# Patient Record
Sex: MALE | Race: BLACK OR AFRICAN AMERICAN | NOT HISPANIC OR LATINO | URBAN - METROPOLITAN AREA
[De-identification: names, ages, dates, MRNs, and addresses within clinical notes are randomized per-mention and may not be internally consistent; named-entity substitution may affect disease eponyms.]

---

## 2019-04-02 ENCOUNTER — EMERGENCY (EMERGENCY)
Facility: HOSPITAL | Age: 39
LOS: 0 days | Discharge: HOME | End: 2019-04-02
Admitting: PHYSICIAN ASSISTANT
Payer: SUBSIDIZED

## 2019-04-02 VITALS
SYSTOLIC BLOOD PRESSURE: 126 MMHG | HEART RATE: 88 BPM | DIASTOLIC BLOOD PRESSURE: 68 MMHG | TEMPERATURE: 99 F | OXYGEN SATURATION: 98 % | RESPIRATION RATE: 18 BRPM

## 2019-04-02 VITALS — TEMPERATURE: 99 F

## 2019-04-02 DIAGNOSIS — L03.115 CELLULITIS OF RIGHT LOWER LIMB: ICD-10-CM

## 2019-04-02 DIAGNOSIS — M79.89 OTHER SPECIFIED SOFT TISSUE DISORDERS: ICD-10-CM

## 2019-04-02 LAB
ALBUMIN SERPL ELPH-MCNC: 4.4 G/DL — SIGNIFICANT CHANGE UP (ref 3.5–5.2)
ALP SERPL-CCNC: 66 U/L — SIGNIFICANT CHANGE UP (ref 30–115)
ALT FLD-CCNC: 26 U/L — SIGNIFICANT CHANGE UP (ref 0–41)
ANION GAP SERPL CALC-SCNC: 15 MMOL/L — HIGH (ref 7–14)
APTT BLD: SIGNIFICANT CHANGE UP SEC (ref 27–39.2)
AST SERPL-CCNC: 24 U/L — SIGNIFICANT CHANGE UP (ref 0–41)
BILIRUB SERPL-MCNC: 0.6 MG/DL — SIGNIFICANT CHANGE UP (ref 0.2–1.2)
BUN SERPL-MCNC: 15 MG/DL — SIGNIFICANT CHANGE UP (ref 10–20)
CALCIUM SERPL-MCNC: 9.2 MG/DL — SIGNIFICANT CHANGE UP (ref 8.5–10.1)
CHLORIDE SERPL-SCNC: 98 MMOL/L — SIGNIFICANT CHANGE UP (ref 98–110)
CO2 SERPL-SCNC: 24 MMOL/L — SIGNIFICANT CHANGE UP (ref 17–32)
CREAT SERPL-MCNC: 0.9 MG/DL — SIGNIFICANT CHANGE UP (ref 0.7–1.5)
GLUCOSE SERPL-MCNC: 100 MG/DL — HIGH (ref 70–99)
HCT VFR BLD CALC: 42.1 % — SIGNIFICANT CHANGE UP (ref 42–52)
HGB BLD-MCNC: 13.7 G/DL — LOW (ref 14–18)
INR BLD: 1.16 RATIO — SIGNIFICANT CHANGE UP (ref 0.65–1.3)
LACTATE SERPL-SCNC: 0.9 MMOL/L — SIGNIFICANT CHANGE UP (ref 0.5–2.2)
MCHC RBC-ENTMCNC: 29.3 PG — SIGNIFICANT CHANGE UP (ref 27–31)
MCHC RBC-ENTMCNC: 32.5 G/DL — SIGNIFICANT CHANGE UP (ref 32–37)
MCV RBC AUTO: 90.1 FL — SIGNIFICANT CHANGE UP (ref 80–94)
NRBC # BLD: 0 /100 WBCS — SIGNIFICANT CHANGE UP (ref 0–0)
PLATELET # BLD AUTO: 203 K/UL — SIGNIFICANT CHANGE UP (ref 130–400)
POTASSIUM SERPL-MCNC: 4.6 MMOL/L — SIGNIFICANT CHANGE UP (ref 3.5–5)
POTASSIUM SERPL-SCNC: 4.6 MMOL/L — SIGNIFICANT CHANGE UP (ref 3.5–5)
PROT SERPL-MCNC: 8.1 G/DL — HIGH (ref 6–8)
PROTHROM AB SERPL-ACNC: 13.3 SEC — HIGH (ref 9.95–12.87)
RBC # BLD: 4.67 M/UL — LOW (ref 4.7–6.1)
RBC # FLD: 11.9 % — SIGNIFICANT CHANGE UP (ref 11.5–14.5)
SODIUM SERPL-SCNC: 137 MMOL/L — SIGNIFICANT CHANGE UP (ref 135–146)
WBC # BLD: 7.65 K/UL — SIGNIFICANT CHANGE UP (ref 4.8–10.8)
WBC # FLD AUTO: 7.65 K/UL — SIGNIFICANT CHANGE UP (ref 4.8–10.8)

## 2019-04-02 PROCEDURE — 99284 EMERGENCY DEPT VISIT MOD MDM: CPT

## 2019-04-02 PROCEDURE — 93970 EXTREMITY STUDY: CPT | Mod: 26

## 2019-04-02 RX ADMIN — Medication 110 MILLIGRAM(S): at 17:12

## 2019-04-02 NOTE — ED PROVIDER NOTE - OBJECTIVE STATEMENT
38 year old male with no pmhx presents with redness and swelling to right calf x few days. Pt admits noticed it on left calf which has not resolved. Pt denies trauma, injury, numbness or weakness to extremity, CP, SOB, or fever. no recent travel or yard work.

## 2019-04-02 NOTE — ED PROVIDER NOTE - PHYSICAL EXAMINATION
CONST: Well appearing in NAD  EYES: PERRL, EOMI, Sclera and conjunctiva clear.   ENT: No nasal discharge. TM's clear B/L without drainage. Oropharynx normal appearing, no erythema or exudates. No abscess or swelling. Uvula midline.  NECK: Non-tender, no meningeal signs. normal ROM. supple   CARD: Normal S1 S2; Normal rate and rhythm  RESP: Equal BS B/L, No wheezes, rhonchi or rales. No distress  GI: Soft, non-tender, non-distended. no cva tenderness. normal BS  MS: Normal ROM in all extremities. No midline spinal tenderness. pulses 2 +.   SKIN:  + erythema noted to right anterior shin, likely cellulitis, no abscess or fluculance, mild erythema to left distal anterior shin  NEURO: A&Ox3, No focal deficits. Strength 5/5 with no sensory deficits. Steady gait.

## 2019-04-02 NOTE — ED PROVIDER NOTE - CONDITION AT DISCHARGE:
ESCITALOPRAM 20MG TABLETS       Last Written Prescription Date: 4/26/17  Last Fill Quantity: 30, # refills: 0  [place] [facility]:  8/23/16        Last PHQ-9 score on record=   PHQ-9 SCORE 4/28/2014   Total Score 3                  Improved

## 2019-04-02 NOTE — ED PROVIDER NOTE - NS ED ROS FT
Review of Systems:  	•	CONSTITUTIONAL - no fever, no diaphoresis, no chills  	•	SKIN - no rash  	•	EYES - no eye pain, no blurry vision  	•	ENT - no change in hearing, no sore throat, no ear pain or tinnitus  	•	RESPIRATORY - no shortness of breath, no cough  	•	CARDIAC - no chest pain, no palpitations  	•	GI - no abd pain, no nausea, no vomiting, no diarrhea, no constipation  	•	GENITO-URINARY - no discharge, no dysuria; no hematuria, no increased urinary frequency  	•	MUSCULOSKELETAL - + calf redness and swelling   	•	NEUROLOGIC - no weakness, no headache, no paresthesias, no LOC

## 2019-04-02 NOTE — ED PROVIDER NOTE - CLINICAL SUMMARY MEDICAL DECISION MAKING FREE TEXT BOX
Pt presenting with cellulitis to right calf, wbc normal, no fever, no medical conditions.. will give PO axbx, and wound follow up.. return precautions given to pt

## 2019-04-02 NOTE — ED ADULT NURSE NOTE - OBJECTIVE STATEMENT
pt comes in with complaints of right leg pain, swelling and redness. left leg is also swollen and red. not as bad as right.

## 2020-07-06 ENCOUNTER — EMERGENCY (EMERGENCY)
Facility: HOSPITAL | Age: 40
LOS: 0 days | Discharge: HOME | End: 2020-07-06
Attending: EMERGENCY MEDICINE | Admitting: EMERGENCY MEDICINE
Payer: MEDICAID

## 2020-07-06 VITALS
RESPIRATION RATE: 20 BRPM | OXYGEN SATURATION: 94 % | DIASTOLIC BLOOD PRESSURE: 80 MMHG | WEIGHT: 274.92 LBS | SYSTOLIC BLOOD PRESSURE: 143 MMHG | TEMPERATURE: 98 F | HEART RATE: 91 BPM

## 2020-07-06 DIAGNOSIS — M54.9 DORSALGIA, UNSPECIFIED: ICD-10-CM

## 2020-07-06 DIAGNOSIS — M25.569 PAIN IN UNSPECIFIED KNEE: ICD-10-CM
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
## 2020-07-06 DIAGNOSIS — S89.92XA UNSPECIFIED INJURY OF LEFT LOWER LEG, INITIAL ENCOUNTER: ICD-10-CM

## 2020-07-06 PROCEDURE — 99284 EMERGENCY DEPT VISIT MOD MDM: CPT

## 2020-07-06 PROCEDURE — 72100 X-RAY EXAM L-S SPINE 2/3 VWS: CPT | Mod: 26

## 2020-07-06 PROCEDURE — 73562 X-RAY EXAM OF KNEE 3: CPT | Mod: 26,LT

## 2020-07-06 RX ORDER — METHOCARBAMOL 500 MG/1
1000 TABLET, FILM COATED ORAL ONCE
Refills: 0 | Status: COMPLETED | OUTPATIENT
Start: 2020-07-06 | End: 2020-07-06

## 2020-07-06 RX ORDER — IBUPROFEN 200 MG
800 TABLET ORAL ONCE
Refills: 0 | Status: COMPLETED | OUTPATIENT
Start: 2020-07-06 | End: 2020-07-06

## 2020-07-06 RX ORDER — IBUPROFEN 200 MG
1 TABLET ORAL
Qty: 21 | Refills: 0
Start: 2020-07-06 | End: 2020-07-12

## 2020-07-06 RX ORDER — METHOCARBAMOL 500 MG/1
1 TABLET, FILM COATED ORAL
Qty: 21 | Refills: 0
Start: 2020-07-06 | End: 2020-07-12

## 2020-07-06 RX ADMIN — METHOCARBAMOL 1000 MILLIGRAM(S): 500 TABLET, FILM COATED ORAL at 14:49

## 2020-07-06 RX ADMIN — Medication 800 MILLIGRAM(S): at 14:48

## 2020-07-06 NOTE — ED PROVIDER NOTE - ATTENDING CONTRIBUTION TO CARE
41 y/o male denies sig PMH c/o left knee and back pain s/p slip and fall on wet surface last night, pain is constant, worse with certain movements and position, better with rest, denies head trauma, LOC, paresthesias or other complaints at present.     No old chart available for review.  I have reviewed and agree with the nursing note, except as documented in my note.    VSS, awake, alert, non-toxic appearing, Head NC / NT, PERRL / EOMI, no hemotympanum, +S1/S2, RRR, no m/r/g, abdomen soft, NT, ND, +BS, no midline spinal tenderness, step-offs or deformities, able to dorsiflex b/l great toe, motor and sensation intact b/l LE, NV intact, LLE; no hip, ankle or foot tenderness, lft knee; mild ttp anteriorly, otherwise appears symmetrical, no gross deformity, swelling, crepitus, joint line tenderness, able to fully flex and extend knee without encouragement, no joint laxity noted on varus or valgus stress at 0 and 30 degrees, normal Lachman and posterior drawer, motor and sensation intact distally, NV intact with 2+ DP pulses.

## 2020-07-06 NOTE — ED PROVIDER NOTE - OBJECTIVE STATEMENT
41 y/o male presents to the ED c/o "I slipped and fell on wet floor and I twisted my left knee and I hurt my back." no head trauma/ LOC/ dizziness/ nausea/ vomit

## 2020-07-06 NOTE — ED PROVIDER NOTE - PROGRESS NOTE DETAILS
BH: prior to d/c xrays reviewed, pain improved while in ED with analgesics, RICE instructions given. The patient was given detailed return precautions and advised to return to the emergency department if any new symptoms developed, symptoms worsened or for any concerns. The patient was offered the opportunity to ask questions and verbalized that they understand the diagnosis and discharge instructions.

## 2020-07-06 NOTE — ED PROVIDER NOTE - CARE PROVIDER_API CALL
Juan Guzman  Orthopaedic Surgery  [address] [place] [address]  Phone: [phone]  Fax: [phone]  Follow Up Time:

## 2020-07-06 NOTE — ED PROVIDER NOTE - PATIENT PORTAL LINK FT
You can access the FollowMyHealth Patient Portal offered by [facility] by registering at the following website: [email or web address]. By joining [employer]’s FollowMyHealth portal, you will also be able to view your health information using other applications (apps) compatible with our system.

## 2020-07-06 NOTE — ED PROVIDER NOTE - CARE PLAN
Principal Discharge DX:	Fall  Secondary Diagnosis:	Back pain  Secondary Diagnosis:	Knee injury, left, initial encounter

## 2020-07-06 NOTE — ED PROVIDER NOTE - NSFOLLOWUPINSTRUCTIONS_ED_ALL_ED_FT
Knee Pain    WHAT YOU NEED TO KNOW:    What do I need to know about knee pain? Knee pain may start suddenly, or it may be a long-term problem. You may have pain on the side, front, or back of your knee. You may have knee stiffness and swelling. You may hear popping sounds or feel like your knee is giving way or locking up as you walk. You may feel pain when you sit, stand, walk, or climb up and down stairs.    What increases my risk for knee pain?     Obesity      A strain or tear in ligaments or tendons      A leg fracture or knee dislocation      Overuse of your knee      Osteoarthritis, rheumatoid arthritis, or gout      An infection, tumor, or cyst in your knee      Shoes that are not supportive, or training on a hard surface      Sports that involve jumping or pivoting on your knee    How is the cause of knee pain diagnosed? Your healthcare provider will examine your knee and ask about your symptoms. Tell your provider when the pain started and what you were doing at the time. Describe the pain, such as sharp, throbbing, or achy. Tell your provider about any knee injury or surgery you had. You may need any of the following:     MRI, CT, or ultrasound pictures may show an injury, fracture, or tumor.       Blood tests may be used to check the level of inflammation in your blood. The tests may also show signs of infection.      Arthroscopy is a procedure to look inside your knee joint with an arthroscope. An arthroscope is a flexible tube with a light and camera on the end. A knee arthroscopy is usually done to check for disease or damage inside your knee. These problems may be fixed during the procedure.    How is knee pain treated? Treatment will depend on the cause of your pain. You may need any of the following:     NSAIDs help decrease swelling and pain or fever. This medicine is available with or without a doctor's order. NSAIDs can cause stomach bleeding or kidney problems in certain people. If you take blood thinner medicine, always ask your healthcare provider if NSAIDs are safe for you. Always read the medicine label and follow directions.      Acetaminophen decreases pain and fever. It is available without a doctor's order. Ask how much to take and how often to take it. Follow directions. Read the labels of all other medicines you are using to see if they also contain acetaminophen, or ask your doctor or pharmacist. Acetaminophen can cause liver damage if not taken correctly. Do not use more than 4 grams (4,000 milligrams) total of acetaminophen in one day.       Prescription pain medicine may be given. Ask your healthcare provider how to take this medicine safely. Some prescription pain medicines contain acetaminophen. Do not take other medicines that contain acetaminophen without talking to your healthcare provider. Too much acetaminophen may cause liver damage. Prescription pain medicine may cause constipation. Ask your healthcare provider how to prevent or treat constipation.       Steroid injections may be given into your knee. Steroids reduce inflammation and pain.      Surgery may be used for some injuries, such as to repair a torn ACL.    What can I do to manage my symptoms?     Rest your knee so it can heal. Limit activities that increase your pain. Do low-impact exercises, such as walking or swimming.       Apply ice to help reduce swelling and pain. Use an ice pack, or put crushed ice in a plastic bag. Cover it with a towel before you apply it to your knee. Apply ice for 15 to 20 minutes every hour, or as directed.      Apply compression to help reduce swelling. Use a brace or bandage only as directed.      Elevate your knee to help decrease pain and swelling. Elevate your knee while you are sitting or lying down. Prop your leg on pillows to keep your knee above the level of your heart.      Prevent your knee from moving as directed. Your healthcare provider may put on a cast or splint. You may need to wear a leg brace to stabilize your knee. A leg brace can be adjusted to increase your range of motion as your knee heals.Hinged Knee Braces          What can I do to prevent knee pain?     Maintain a healthy weight. Extra weight increases your risk for knee pain. Ask your healthcare provider how much you should weigh. He or she can help you create a safe weight loss plan if you need to lose weight.      Exercise or train properly. Use the correct equipment for sports. Wear shoes that provide good support. Check your posture often as you exercise, play sports, or train for an event. This can help prevent stress and strain on your knees. Rest between sessions so you do not overwork your knees.    When should I seek immediate care?     Your pain is worse, even after treatment.       You cannot bend or straighten your leg completely.       The swelling around your knee does not go down even with treatment.      Your knee is painful and hot to the touch.     When should I contact my healthcare provider?     You have questions or concerns about your condition or care.         CARE AGREEMENT:    You have the right to help plan your care. Learn about your health condition and how it may be treated. Discuss treatment options with your healthcare providers to decide what care you want to receive. You always have the right to refuse treatment.     Back Pain    Back pain is very common in adults. The cause of back pain is rarely dangerous and the pain often gets better over time. The cause of your back pain may not be known and may include strain of muscles or ligaments, degeneration of the spinal disks, or arthritis. Occasionally the pain may radiate down your leg(s). Over-the-counter medicines to reduce pain and inflammation are often the most helpful. Stretching and remaining active frequently helps the healing process.     SEEK IMMEDIATE MEDICAL CARE IF YOU HAVE THE FOLLOWING SYMPTOMS: bowel or bladder control problems, unusual weakness or numbness in your arms or legs, nausea or vomiting, abdominal pain, fever, dizziness/lightheadedness.

## 2022-12-03 NOTE — ED ADULT NURSE NOTE - NS ED NURSE RECORD ANOTHER HT AND WT
No You can access the FollowMyHealth Patient Portal offered by [facility] by registering at the following website: [email or web address]. By joining [facility]’s FollowMyHealth portal, you will also be able to view your health information using other applications (apps) compatible with our system.

## 2023-10-02 NOTE — ED PROVIDER NOTE - VASCULAR COMPROMISE
For the arthritis: Start 1 capsule of duloxetine daily.  Continue with tylenol
no vascular compromise

## 2024-06-04 ENCOUNTER — EMERGENCY (EMERGENCY)
Facility: HOSPITAL | Age: 44
LOS: 0 days | Discharge: HOME | End: 2024-06-04
Attending: EMERGENCY MEDICINE
Payer: MEDICAID

## 2024-06-04 VITALS
OXYGEN SATURATION: 99 % | WEIGHT: 315 LBS | RESPIRATION RATE: 16 BRPM | HEART RATE: 84 BPM | SYSTOLIC BLOOD PRESSURE: 150 MMHG | TEMPERATURE: 98 F | DIASTOLIC BLOOD PRESSURE: 100 MMHG

## 2024-06-04 VITALS — DIASTOLIC BLOOD PRESSURE: 87 MMHG | SYSTOLIC BLOOD PRESSURE: 133 MMHG | HEART RATE: 105 BPM

## 2024-06-04 DIAGNOSIS — R29.6 REPEATED FALLS: ICD-10-CM

## 2024-06-04 DIAGNOSIS — R73.9 HYPERGLYCEMIA, UNSPECIFIED: ICD-10-CM

## 2024-06-04 DIAGNOSIS — R35.0 FREQUENCY OF MICTURITION: ICD-10-CM

## 2024-06-04 DIAGNOSIS — R30.0 DYSURIA: ICD-10-CM

## 2024-06-04 DIAGNOSIS — R63.1 POLYDIPSIA: ICD-10-CM

## 2024-06-04 PROBLEM — Z78.9 OTHER SPECIFIED HEALTH STATUS: Chronic | Status: ACTIVE | Noted: 2020-07-06

## 2024-06-04 LAB
ALBUMIN SERPL ELPH-MCNC: 4.3 G/DL — SIGNIFICANT CHANGE UP (ref 3.5–5.2)
ALBUMIN SERPL ELPH-MCNC: 4.4 G/DL — SIGNIFICANT CHANGE UP (ref 3.5–5.2)
ALP SERPL-CCNC: 107 U/L — SIGNIFICANT CHANGE UP (ref 30–115)
ALP SERPL-CCNC: 112 U/L — SIGNIFICANT CHANGE UP (ref 30–115)
ALT FLD-CCNC: 30 U/L — SIGNIFICANT CHANGE UP (ref 0–41)
ALT FLD-CCNC: 36 U/L — SIGNIFICANT CHANGE UP (ref 0–41)
ANION GAP SERPL CALC-SCNC: 10 MMOL/L — SIGNIFICANT CHANGE UP (ref 7–14)
ANION GAP SERPL CALC-SCNC: 13 MMOL/L — SIGNIFICANT CHANGE UP (ref 7–14)
APPEARANCE UR: CLEAR — SIGNIFICANT CHANGE UP
AST SERPL-CCNC: 21 U/L — SIGNIFICANT CHANGE UP (ref 0–41)
AST SERPL-CCNC: 71 U/L — HIGH (ref 0–41)
B-OH-BUTYR SERPL-SCNC: 0.4 MMOL/L — SIGNIFICANT CHANGE UP
BASOPHILS # BLD AUTO: 0.03 K/UL — SIGNIFICANT CHANGE UP (ref 0–0.2)
BASOPHILS NFR BLD AUTO: 0.5 % — SIGNIFICANT CHANGE UP (ref 0–1)
BILIRUB SERPL-MCNC: 0.4 MG/DL — SIGNIFICANT CHANGE UP (ref 0.2–1.2)
BILIRUB SERPL-MCNC: 0.5 MG/DL — SIGNIFICANT CHANGE UP (ref 0.2–1.2)
BILIRUB UR-MCNC: NEGATIVE — SIGNIFICANT CHANGE UP
BUN SERPL-MCNC: 11 MG/DL — SIGNIFICANT CHANGE UP (ref 10–20)
BUN SERPL-MCNC: 12 MG/DL — SIGNIFICANT CHANGE UP (ref 10–20)
CALCIUM SERPL-MCNC: 9.2 MG/DL — SIGNIFICANT CHANGE UP (ref 8.4–10.4)
CALCIUM SERPL-MCNC: 9.5 MG/DL — SIGNIFICANT CHANGE UP (ref 8.4–10.5)
CHLORIDE SERPL-SCNC: 100 MMOL/L — SIGNIFICANT CHANGE UP (ref 98–110)
CHLORIDE SERPL-SCNC: 96 MMOL/L — LOW (ref 98–110)
CO2 SERPL-SCNC: 21 MMOL/L — SIGNIFICANT CHANGE UP (ref 17–32)
CO2 SERPL-SCNC: 24 MMOL/L — SIGNIFICANT CHANGE UP (ref 17–32)
COLOR SPEC: YELLOW — SIGNIFICANT CHANGE UP
CREAT SERPL-MCNC: 0.9 MG/DL — SIGNIFICANT CHANGE UP (ref 0.7–1.5)
CREAT SERPL-MCNC: 1 MG/DL — SIGNIFICANT CHANGE UP (ref 0.7–1.5)
DIFF PNL FLD: ABNORMAL
EGFR: 107 ML/MIN/1.73M2 — SIGNIFICANT CHANGE UP
EGFR: 95 ML/MIN/1.73M2 — SIGNIFICANT CHANGE UP
EOSINOPHIL # BLD AUTO: 0.1 K/UL — SIGNIFICANT CHANGE UP (ref 0–0.7)
EOSINOPHIL NFR BLD AUTO: 1.8 % — SIGNIFICANT CHANGE UP (ref 0–8)
GAS PNL BLDV: SIGNIFICANT CHANGE UP
GLUCOSE SERPL-MCNC: 367 MG/DL — HIGH (ref 70–99)
GLUCOSE SERPL-MCNC: 424 MG/DL — HIGH (ref 70–99)
GLUCOSE UR QL: >=1000 MG/DL
HCT VFR BLD CALC: 46.1 % — SIGNIFICANT CHANGE UP (ref 42–52)
HCV AB S/CO SERPL IA: 0.06 COI — SIGNIFICANT CHANGE UP
HCV AB SERPL-IMP: SIGNIFICANT CHANGE UP
HGB BLD-MCNC: 15.7 G/DL — SIGNIFICANT CHANGE UP (ref 14–18)
HIV 1 & 2 AB SERPL IA.RAPID: SIGNIFICANT CHANGE UP
IMM GRANULOCYTES NFR BLD AUTO: 0.4 % — HIGH (ref 0.1–0.3)
KETONES UR-MCNC: NEGATIVE MG/DL — SIGNIFICANT CHANGE UP
LEUKOCYTE ESTERASE UR-ACNC: NEGATIVE — SIGNIFICANT CHANGE UP
LYMPHOCYTES # BLD AUTO: 2.37 K/UL — SIGNIFICANT CHANGE UP (ref 1.2–3.4)
LYMPHOCYTES # BLD AUTO: 43.2 % — SIGNIFICANT CHANGE UP (ref 20.5–51.1)
MAGNESIUM SERPL-MCNC: 2.1 MG/DL — SIGNIFICANT CHANGE UP (ref 1.8–2.4)
MCHC RBC-ENTMCNC: 29.7 PG — SIGNIFICANT CHANGE UP (ref 27–31)
MCHC RBC-ENTMCNC: 34.1 G/DL — SIGNIFICANT CHANGE UP (ref 32–37)
MCV RBC AUTO: 87.3 FL — SIGNIFICANT CHANGE UP (ref 80–94)
MONOCYTES # BLD AUTO: 0.54 K/UL — SIGNIFICANT CHANGE UP (ref 0.1–0.6)
MONOCYTES NFR BLD AUTO: 9.9 % — HIGH (ref 1.7–9.3)
NEUTROPHILS # BLD AUTO: 2.42 K/UL — SIGNIFICANT CHANGE UP (ref 1.4–6.5)
NEUTROPHILS NFR BLD AUTO: 44.2 % — SIGNIFICANT CHANGE UP (ref 42.2–75.2)
NITRITE UR-MCNC: NEGATIVE — SIGNIFICANT CHANGE UP
NRBC # BLD: 0 /100 WBCS — SIGNIFICANT CHANGE UP (ref 0–0)
PH UR: 6 — SIGNIFICANT CHANGE UP (ref 5–8)
PHOSPHATE SERPL-MCNC: 4.3 MG/DL — SIGNIFICANT CHANGE UP (ref 2.1–4.9)
PLATELET # BLD AUTO: 264 K/UL — SIGNIFICANT CHANGE UP (ref 130–400)
PMV BLD: 11.3 FL — HIGH (ref 7.4–10.4)
POTASSIUM SERPL-MCNC: 4.6 MMOL/L — SIGNIFICANT CHANGE UP (ref 3.5–5)
POTASSIUM SERPL-MCNC: SIGNIFICANT CHANGE UP MMOL/L (ref 3.5–5)
POTASSIUM SERPL-SCNC: 4.6 MMOL/L — SIGNIFICANT CHANGE UP (ref 3.5–5)
POTASSIUM SERPL-SCNC: SIGNIFICANT CHANGE UP MMOL/L (ref 3.5–5)
PROT SERPL-MCNC: 7.6 G/DL — SIGNIFICANT CHANGE UP (ref 6–8)
PROT SERPL-MCNC: 8.5 G/DL — HIGH (ref 6–8)
PROT UR-MCNC: NEGATIVE MG/DL — SIGNIFICANT CHANGE UP
RBC # BLD: 5.28 M/UL — SIGNIFICANT CHANGE UP (ref 4.7–6.1)
RBC # FLD: 12.2 % — SIGNIFICANT CHANGE UP (ref 11.5–14.5)
SODIUM SERPL-SCNC: 127 MMOL/L — LOW (ref 135–146)
SODIUM SERPL-SCNC: 137 MMOL/L — SIGNIFICANT CHANGE UP (ref 135–146)
SP GR SPEC: >1.03 — HIGH (ref 1–1.03)
SPECIMEN SOURCE: SIGNIFICANT CHANGE UP
UROBILINOGEN FLD QL: 0.2 MG/DL — SIGNIFICANT CHANGE UP (ref 0.2–1)
WBC # BLD: 5.48 K/UL — SIGNIFICANT CHANGE UP (ref 4.8–10.8)
WBC # FLD AUTO: 5.48 K/UL — SIGNIFICANT CHANGE UP (ref 4.8–10.8)

## 2024-06-04 PROCEDURE — 85014 HEMATOCRIT: CPT

## 2024-06-04 PROCEDURE — 86703 HIV-1/HIV-2 1 RESULT ANTBDY: CPT

## 2024-06-04 PROCEDURE — 85025 COMPLETE CBC W/AUTO DIFF WBC: CPT

## 2024-06-04 PROCEDURE — 83735 ASSAY OF MAGNESIUM: CPT

## 2024-06-04 PROCEDURE — 93010 ELECTROCARDIOGRAM REPORT: CPT

## 2024-06-04 PROCEDURE — 80053 COMPREHEN METABOLIC PANEL: CPT

## 2024-06-04 PROCEDURE — 36000 PLACE NEEDLE IN VEIN: CPT

## 2024-06-04 PROCEDURE — 82010 KETONE BODYS QUAN: CPT

## 2024-06-04 PROCEDURE — 82962 GLUCOSE BLOOD TEST: CPT

## 2024-06-04 PROCEDURE — 86803 HEPATITIS C AB TEST: CPT

## 2024-06-04 PROCEDURE — 99285 EMERGENCY DEPT VISIT HI MDM: CPT

## 2024-06-04 PROCEDURE — 93005 ELECTROCARDIOGRAM TRACING: CPT

## 2024-06-04 PROCEDURE — 81001 URINALYSIS AUTO W/SCOPE: CPT

## 2024-06-04 PROCEDURE — 36415 COLL VENOUS BLD VENIPUNCTURE: CPT

## 2024-06-04 PROCEDURE — 84100 ASSAY OF PHOSPHORUS: CPT

## 2024-06-04 PROCEDURE — 84295 ASSAY OF SERUM SODIUM: CPT

## 2024-06-04 PROCEDURE — 99284 EMERGENCY DEPT VISIT MOD MDM: CPT | Mod: 25

## 2024-06-04 PROCEDURE — 87491 CHLMYD TRACH DNA AMP PROBE: CPT

## 2024-06-04 PROCEDURE — 85018 HEMOGLOBIN: CPT

## 2024-06-04 PROCEDURE — 84132 ASSAY OF SERUM POTASSIUM: CPT

## 2024-06-04 PROCEDURE — 87591 N.GONORRHOEAE DNA AMP PROB: CPT

## 2024-06-04 PROCEDURE — 82803 BLOOD GASES ANY COMBINATION: CPT

## 2024-06-04 PROCEDURE — 82330 ASSAY OF CALCIUM: CPT

## 2024-06-04 PROCEDURE — 83605 ASSAY OF LACTIC ACID: CPT

## 2024-06-04 RX ORDER — SODIUM CHLORIDE 9 MG/ML
1000 INJECTION, SOLUTION INTRAVENOUS ONCE
Refills: 0 | Status: COMPLETED | OUTPATIENT
Start: 2024-06-04 | End: 2024-06-04

## 2024-06-04 RX ORDER — METFORMIN HYDROCHLORIDE 850 MG/1
1 TABLET ORAL
Qty: 28 | Refills: 0
Start: 2024-06-04 | End: 2024-06-17

## 2024-06-04 RX ADMIN — SODIUM CHLORIDE 1000 MILLILITER(S): 9 INJECTION, SOLUTION INTRAVENOUS at 13:34

## 2024-06-04 NOTE — ED PROVIDER NOTE - PRO INTERPRETER NEED 2
English
Simple: Patient demonstrates quick and easy understanding/Returned Demonstration/Verbalized Understanding

## 2024-06-04 NOTE — ED PROVIDER NOTE - OBJECTIVE STATEMENT
43-year-old male with no PMH presents to the ED, increased urinary frequency and dysuria patient states he is having frequent falls at home and states the dysuria started this week.  States went to urgent care on 5/10 for the increased urinary frequency, and was told by the staff member that he should have a workup done for DKA however patient never followed up.  Reports that at that time he had a normal blood glucose level, but does not have the results on hand.  Reports he saw his PMD last year and all of his workup was negative.  Patient reports increased thirst, particularly after he goes to the restroom.  Denies fevers, chills, dizziness, syncope, chest pain, shortness of breath, abdominal pain, nausea, vomiting, diarrhea, constipation, back pain, hematuria, black/bloody stools or numbness/tingling in the extremities.

## 2024-06-04 NOTE — ED PROVIDER NOTE - PHYSICAL EXAMINATION
VITAL SIGNS: I have reviewed nursing notes and confirm.  CONSTITUTIONAL: well-appearing, non-toxic, NAD  SKIN: Warm dry  HEAD: NCAT  EYES: EOMI, PERRLA, no scleral icterus  ENT: Moist mucous membranes, normal pharynx with no erythema or exudates  NECK: Supple; non tender. Full ROM. No cervical LAD  CARD: RRR, no murmurs, rubs or gallops  RESP: clear to ausculation b/l.  No rales, rhonchi, or wheezing.  ABD: soft, non-tender, non-distended, no rebound or guarding. No CVA tenderness  EXT: Full ROM, no bony tenderness, no pedal edema, no calf tenderness  NEURO: normal motor. normal sensory. CN II-XII intact. Cerebellar testing normal. Normal gait.  PSYCH: Cooperative, appropriate. VITAL SIGNS: I have reviewed nursing notes and confirm.  CONSTITUTIONAL: well-appearing, non-toxic, NAD  SKIN: Warm dry  HEAD: NCAT  EYES: EOMI, PERRLA, no scleral icterus  ENT: Moist mucous membranes, normal pharynx with no erythema or exudates  NECK: Supple; non tender. Full ROM. No cervical LAD  CARD: RRR, no murmurs, rubs or gallops  RESP: clear to ausculation b/l.  No rales, rhonchi, or wheezing.  ABD: soft, non-tender, non-distended, no rebound or guarding. No CVA tenderness  EXT: Full ROM, no bony tenderness, no pedal edema, no calf tenderness  NEURO: normal motor. normal sensory. CN II-XII intact. Normal gait.  PSYCH: Cooperative, appropriate.

## 2024-06-04 NOTE — ED PROVIDER NOTE - PATIENT PORTAL LINK FT
You can access the FollowMyHealth Patient Portal offered by [facility] by registering at the following website: [email or web address]. By joining [employer]’s FollowMyHealth portal, you will also be able to view your health information using other applications (apps) compatible with our system.

## 2024-06-04 NOTE — ED PROVIDER NOTE - PROGRESS NOTE DETAILS
Lab results discussed with patient. Patient provided information material on diabetes. Will discharge home with metformin and PMD follow-up. Has appointment scheduled with PMD on 6/6/24.

## 2024-06-04 NOTE — ED PROVIDER NOTE - NSFOLLOWUPINSTRUCTIONS_ED_ALL_ED_FT
Diabetes is a chronic condition caused by high blood sugar levels. Your blood sugar levels become high because your body does not have enough insulin. Insulin helps move sugar out of the blood so it can be used for energy. Increased sugar in your blood can cause problems in several organs of your body including but not limited to your blood vessels, your kidneys, your brain, and your eyes. The lack of insulin forces your body to use fat instead of sugar for energy. This can be dangerous if not controlled.      Seek Medical Attention If:   You have a seizure.   You begin to breathe fast, or are short of breath.   You become weak and confused. You are more drowsy than usual.   You have fruity, sweet breath. You have severe, new stomach pain and are vomiting.    Your blood sugar level is lower or higher than your healthcare provider says it should be.   You have ketones in your blood or urine.   You have a fever or chills.   You are more thirsty than usual.    You are urinating more often than usual.   You have questions or concerns about your condition or care.  Insulin and diabetes medicine decreases the amount of sugar in your blood.   The best way to prevent problems from Diabetes is to control your blood sugars.   Monitor your blood sugar levels closely.   Administer Insulin as directed by your physician.    Speak with your doctor and/or a nutritionist about the best way to change your lifestyle and dietary habits to avoid any problems from Diabetes in the future.

## 2024-06-04 NOTE — ED ADULT NURSE NOTE - NSFALLUNIVINTERV_ED_ALL_ED
Bed/Stretcher in lowest position, wheels locked, appropriate side rails in place/Call bell, personal items and telephone in reach/Instruct patient to call for assistance before getting out of bed/chair/stretcher/Non-slip footwear applied when patient is off stretcher/[place] to call system/Physically safe environment - no spills, clutter or unnecessary equipment/Purposeful proactive rounding/Room/bathroom lighting operational, light cord in reach

## 2024-06-04 NOTE — ED PROVIDER NOTE - ATTENDING CONTRIBUTION TO CARE
43-year-old male no significant past medical history complaining of 2-3 weeks of urinary frequency.  Patient reports he was seen at an urgent care center approximately 2 weeks ago and completed a course of antibiotics after that visit with no improvement in symptoms.  Patient also reports 2-3 days of dysuria.  No hematuria.  No abdominal pain.  No back pain.  No fever, chills.  No nausea, vomiting.  Patient reports recent constipation.  No dizziness, weakness.  No history of diabetes.  Vitals noted.  CONSTITUTIONAL: Well-appearing; well-nourished; in no apparent distress.   HEAD: Normocephalic; atraumatic.   EYES: PERRL; EOM intact. Conjunctiva normal B/L.   ENT: Normal pharynx with no tonsillar hypertrophy. MMM.  NECK: Supple; non-tender; no cervical lymphadenopathy.   CHEST: Normal chest excursion with respiration.   CARDIOVASCULAR: Normal S1, S2; no murmurs, rubs, or gallops.   RESPIRATORY: Normal chest excursion with respiration; breath sounds clear and equal bilaterally; no wheezes, rhonchi, or rales.  GI/: Normal bowel sounds; non-distended; non-tender.  BACK: No evidence of trauma or deformity. Non-tender to palpation. No CVA tenderness.   EXT: Normal ROM in all four extremities; non-tender to palpation; distal pulses are normal. No leg edema B/L.   SKIN: Normal for age and race; warm; dry; good turgor.  NEURO: A & O x 4; CN 2-12 intact. Grossly unremarkable.

## 2024-06-04 NOTE — ED PROVIDER NOTE - CLINICAL SUMMARY MEDICAL DECISION MAKING FREE TEXT BOX
43-year-old male no sniffing past medical history of 3 weeks of urinary frequency.  Exam normal.  Patient with hyperglycemia.  Labs reviewed, no evidence of DKA.  EKG with no ischemia or arrhythmia.  Patient discharged on metformin and has PMD follow-up in 2 days.  Patient educated on diabetes and given return instructions.

## 2024-06-05 LAB
C TRACH RRNA SPEC QL NAA+PROBE: SIGNIFICANT CHANGE UP
N GONORRHOEA RRNA SPEC QL NAA+PROBE: SIGNIFICANT CHANGE UP

## 2025-07-17 ENCOUNTER — EMERGENCY (EMERGENCY)
Facility: HOSPITAL | Age: 45
LOS: 0 days | Discharge: HOME | End: 2025-07-18
Attending: EMERGENCY MEDICINE
Payer: COMMERCIAL

## 2025-07-17 VITALS
OXYGEN SATURATION: 99 % | DIASTOLIC BLOOD PRESSURE: 106 MMHG | SYSTOLIC BLOOD PRESSURE: 159 MMHG | WEIGHT: 315 LBS | TEMPERATURE: 99 F | RESPIRATION RATE: 18 BRPM | HEART RATE: 92 BPM

## 2025-07-17 PROCEDURE — 72125 CT NECK SPINE W/O DYE: CPT

## 2025-07-17 PROCEDURE — 99053 MED SERV 10PM-8AM 24 HR FAC: CPT

## 2025-07-17 PROCEDURE — 73630 X-RAY EXAM OF FOOT: CPT | Mod: RT

## 2025-07-17 PROCEDURE — 70480 CT ORBIT/EAR/FOSSA W/O DYE: CPT

## 2025-07-17 PROCEDURE — 73610 X-RAY EXAM OF ANKLE: CPT | Mod: RT

## 2025-07-17 PROCEDURE — 74176 CT ABD & PELVIS W/O CONTRAST: CPT

## 2025-07-17 PROCEDURE — 73590 X-RAY EXAM OF LOWER LEG: CPT | Mod: RT

## 2025-07-17 PROCEDURE — 73030 X-RAY EXAM OF SHOULDER: CPT | Mod: RT

## 2025-07-17 PROCEDURE — 70450 CT HEAD/BRAIN W/O DYE: CPT

## 2025-07-17 PROCEDURE — 71046 X-RAY EXAM CHEST 2 VIEWS: CPT

## 2025-07-17 PROCEDURE — 99284 EMERGENCY DEPT VISIT MOD MDM: CPT | Mod: 25

## 2025-07-17 PROCEDURE — 99285 EMERGENCY DEPT VISIT HI MDM: CPT
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
## 2025-07-18 VITALS
HEART RATE: 72 BPM | RESPIRATION RATE: 20 BRPM | OXYGEN SATURATION: 99 % | SYSTOLIC BLOOD PRESSURE: 117 MMHG | DIASTOLIC BLOOD PRESSURE: 85 MMHG | TEMPERATURE: 98 F

## 2025-07-18 DIAGNOSIS — H93.19 TINNITUS, UNSPECIFIED EAR: ICD-10-CM

## 2025-07-18 PROCEDURE — 74176 CT ABD & PELVIS W/O CONTRAST: CPT | Mod: 26

## 2025-07-18 PROCEDURE — 72125 CT NECK SPINE W/O DYE: CPT | Mod: 26

## 2025-07-18 PROCEDURE — 73630 X-RAY EXAM OF FOOT: CPT | Mod: 26,RT

## 2025-07-18 PROCEDURE — 70450 CT HEAD/BRAIN W/O DYE: CPT | Mod: 26

## 2025-07-18 PROCEDURE — 71046 X-RAY EXAM CHEST 2 VIEWS: CPT | Mod: 26

## 2025-07-18 PROCEDURE — 73590 X-RAY EXAM OF LOWER LEG: CPT | Mod: 26,RT

## 2025-07-18 PROCEDURE — 73610 X-RAY EXAM OF ANKLE: CPT | Mod: 26,RT

## 2025-07-18 PROCEDURE — 70480 CT ORBIT/EAR/FOSSA W/O DYE: CPT | Mod: 26,59

## 2025-07-18 PROCEDURE — 73030 X-RAY EXAM OF SHOULDER: CPT | Mod: 26,RT

## 2025-07-18 RX ORDER — IBUPROFEN 200 MG
600 TABLET ORAL ONCE
Refills: 0 | Status: COMPLETED | OUTPATIENT
Start: 2025-07-18 | End: 2025-07-18

## 2025-07-18 RX ORDER — IBUPROFEN 200 MG
1 TABLET ORAL
Qty: 12 | Refills: 0
Start: 2025-07-18 | End: 2025-07-20

## 2025-07-18 RX ORDER — ACETAMINOPHEN 500 MG/5ML
975 LIQUID (ML) ORAL ONCE
Refills: 0 | Status: COMPLETED | OUTPATIENT
Start: 2025-07-18 | End: 2025-07-18

## 2025-07-18 RX ORDER — LIDOCAINE HYDROCHLORIDE 20 MG/ML
1 JELLY TOPICAL
Qty: 7 | Refills: 0
Start: 2025-07-18 | End: 2025-07-24

## 2025-07-18 RX ADMIN — Medication 975 MILLIGRAM(S): at 02:02

## 2025-07-18 RX ADMIN — Medication 600 MILLIGRAM(S): at 02:01
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
## 2025-07-18 NOTE — ED PROVIDER NOTE - NSFOLLOWUPCLINICS_GEN_ALL_ED_FT
[facility] ([facility])  Rehabilitation  [facility] [address] [place] [address]  Phone: [phone]  Fax:   Follow Up Time: Urgent

## 2025-07-18 NOTE — ED PROVIDER NOTE - CLINICAL SUMMARY MEDICAL DECISION MAKING FREE TEXT BOX
Patient remained stable in ED, improved well, results of the diagnostic studies reviewed and discussed with patient, Patient remained awake, alert, ambulatory and comfortable, tolerated PO. Appropriate medications given and effects reassessed. Patient ringing in Rt ear improved during the ED stay. Patient stated that, his ringing in the ear is almost gone and he does not having ringing in the ear at the time of the ED discharge. Patient also denied any changes in vision and denied any changes in hearing. Patient was evaluated by ENT on call and recommended outpatient follow up. Discussed with patient in detail about the need for close outpatient follow up and the need to return to ED for any persistent, or worsening symptoms, for any new symptoms/concerns. patient verbalized understanding and agreed. patient is given detail aftercare instructions and is instructed well to f/u as outpatient for further care.
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
## 2025-07-18 NOTE — ED PROVIDER NOTE - NSFOLLOWUPINSTRUCTIONS_ED_ALL_ED_FT
Please follow with your PCP ASAP for reevaluation and reminder of the care.   Please return to ED immediately for any chest pain, trouble breathing, nausea, vomiting, headache, dizziness, abdominal pain, or any other symptoms/concerns.

## 2025-07-18 NOTE — ED PROVIDER NOTE - PHYSICAL EXAMINATION
No external signs of craniofacial trauma noted.   NO neck belt sign noted.   TERESSA/EOMI, no nystagmus.   Ear exam: NO mastoid tenderness, B/L TM shows no erythema, no perforation noted, No FB noted, No blood noted.   Neck: +mild generalized midline C-spine tenderness, no bony crepitus, no bony step-off noted.   No neck belt sign noted.   RUE exam: +pain on ROM of the Rt shoulder noted, +active/passive ROM of the Rt shoulder noted, RUE is distally NVI.  Back: No tenderness of the upper back noted.   +para spinal lumbar tenderness noted, no swelling, no midline vertebral column tenderness noted.   RLE exam: +pain on ROM of the Rt ankle noted, +tenderness on the medial malleolus area noted. Achilles tendon is intact. Stable ankle joint noted. +mild tenderness of the proximal foot noted, no pain on ROM Of the toes noted. No tenderness of the toes noted. RLE is distally NVI.  No pain on ROM Of the hips noted. NO tenderness of the hips noted.

## 2025-07-18 NOTE — ED PROVIDER NOTE - CARE PROVIDER_API CALL
Pete Darnell)  Otolaryngology Head And Neck Surgery  [address] [place] [id number]  Phone: [phone]  Fax: [phone]  Follow Up Time: Urgent
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
## 2025-07-18 NOTE — ED PROVIDER NOTE - PATIENT PORTAL LINK FT
You can access the FollowMyHealth Patient Portal offered by [facility] by registering at the following website: [email or web address]. By joining [facility]’s FollowMyHealth portal, you will also be able to view your health information using other applications (apps) compatible with our system.

## 2025-07-18 NOTE — ED PROVIDER NOTE - WR ORDER NAME 3
received pt in bed awake alert not in any distress pt able to express his needs as per dr mix ok to give optivo today  Cycle # day 1/1.Height and weight verified. Lab results as per Md buzz aware of same. Vital signs stable prior to the start of chemotherapy, see sunrise.  Pt educated on the importance of saving urine, verbalize understanding of same.Pt education done re chemo regimen, drug effects and potential side effects, written material provided, pt states understanding. Pt reports the he/she has tolerated previous chemotherapy without side effects. Pt with piv  #20 on rt a/c ine, site free from signs and symptoms of infection, positive blood return obtained no premeds pt started on novilomub 480mg x1 infuse over 30min intiated at 1535 pt resting in bed   received pt in bed awake alert not in any distress pt able to express his needs as per dr mix ok to give opdivo today  Cycle #3 day 1/1.Height and weight verified. Lab results as per Md buzz aware of same. Vital signs stable prior to the start of chemotherapy, see sunrise.  Pt educated on the importance of saving urine, verbalize understanding of same.Pt education done re chemo regimen, drug effects and potential side effects, written material provided, pt states understanding. Pt reports the he/she has tolerated previous chemotherapy without side effects. Pt with piv  #20 on rt a/c ine, site free from signs and symptoms of infection, positive blood return obtained no premeds pt started on novilumub 480mg x1 infuse over 30min intiated at 1535 pt resting in bed   received pt in bed awake alert not in any distress pt able to express his needs as per dr mix ok to give opdivo today  Cycle #3 day 1/1.Height and weight verified. Lab results as per Md buzz aware of same. Vital signs stable prior to the start of chemotherapy, see sunrise.  Pt educated on the importance of saving urine, verbalize understanding of same.Pt education done re chemo regimen, drug effects and potential side effects, written material provided, pt states understanding. Pt reports the he has tolerated previous chemotherapy without side effects. Pt with piv  #20 on rt a/c ine, site free from signs and symptoms of infection, positive blood return obtained no premeds pt started on novilumub 480mg x1 infuse over 30min intiated at 1535 pt resting in bed completed infusion at 1605 with no adverse effects post v/s stable   Xray Ankle Complete 3 Views, Right
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
